# Patient Record
Sex: MALE | URBAN - METROPOLITAN AREA
[De-identification: names, ages, dates, MRNs, and addresses within clinical notes are randomized per-mention and may not be internally consistent; named-entity substitution may affect disease eponyms.]

---

## 2024-02-16 ENCOUNTER — OV NP (OUTPATIENT)
Dept: URBAN - METROPOLITAN AREA CLINIC 107 | Facility: CLINIC | Age: 39
End: 2024-02-16
Payer: OTHER GOVERNMENT

## 2024-02-16 ENCOUNTER — LAB (OUTPATIENT)
Dept: URBAN - METROPOLITAN AREA CLINIC 107 | Facility: CLINIC | Age: 39
End: 2024-02-16

## 2024-02-16 VITALS
DIASTOLIC BLOOD PRESSURE: 85 MMHG | HEART RATE: 85 BPM | SYSTOLIC BLOOD PRESSURE: 149 MMHG | BODY MASS INDEX: 27.95 KG/M2 | TEMPERATURE: 97.7 F | WEIGHT: 217.8 LBS | HEIGHT: 74 IN

## 2024-02-16 DIAGNOSIS — K21.9 GASTROESOPHAGEAL REFLUX DISEASE, UNSPECIFIED WHETHER ESOPHAGITIS PRESENT: ICD-10-CM

## 2024-02-16 DIAGNOSIS — R13.19 ESOPHAGEAL DYSPHAGIA: ICD-10-CM

## 2024-02-16 DIAGNOSIS — R10.13 EPIGASTRIC PAIN: ICD-10-CM

## 2024-02-16 PROBLEM — 235595009: Status: ACTIVE | Noted: 2024-02-16

## 2024-02-16 PROCEDURE — 99204 OFFICE O/P NEW MOD 45 MIN: CPT

## 2024-02-16 NOTE — HPI-TODAY'S VISIT:
Mr. Rabago is a pleasant 38-year-old  presenting for evaluation of difficulty swallowing.  He was evaluated at [facility] on 1/3/2020 for for complaints of epigastric pain, burping, and pain radiating to his back.  He had right upper quadrant ultrasound is negative for acute process.  Her labs showed white blood cell count 6.65, hemoglobin 13.8, hematocrit 41.1, low MCH 27.8, platelet count 237,000, normal CMP, negative urinalysis.  He was given GI cocktail and had noticeable improvement of his symptoms.  He is recommended outpatient GI follow-up and started on omeprazole 20 mg daily.  Today he reports that this began in December neurovisit been overall unremarkable.  He reports that on omeprazole he began to feel very bloated so he discontinued this and his PCP started him on Bentyl.  He notes during swallowing difficulty and some chest discomfort.  He reports he feels bloated and has increasing gas, he will drink a carbonated beverage which will produce belching.  This improves his symptoms.  Had a previous endoscopy in 2020 while stationed in [facility] with notable food impaction.  He has attempted to make dietary modifications.  He does report use of ibuprofen, but this is sparingly.  He drinks alcohol on very rare occasions.  He does not smoke cigarettes.

## 2024-04-24 ENCOUNTER — LAB (OUTPATIENT)
Dept: URBAN - METROPOLITAN AREA CLINIC 113 | Facility: CLINIC | Age: 39
End: 2024-04-24

## 2024-04-24 ENCOUNTER — OV EP (OUTPATIENT)
Dept: URBAN - METROPOLITAN AREA CLINIC 113 | Facility: CLINIC | Age: 39
End: 2024-04-24
Payer: OTHER GOVERNMENT

## 2024-04-24 VITALS
HEART RATE: 65 BPM | SYSTOLIC BLOOD PRESSURE: 131 MMHG | DIASTOLIC BLOOD PRESSURE: 84 MMHG | HEIGHT: 74 IN | TEMPERATURE: 97.8 F | RESPIRATION RATE: 18 BRPM | BODY MASS INDEX: 27.46 KG/M2 | WEIGHT: 214 LBS

## 2024-04-24 DIAGNOSIS — K21.9 GASTROESOPHAGEAL REFLUX DISEASE, UNSPECIFIED WHETHER ESOPHAGITIS PRESENT: ICD-10-CM

## 2024-04-24 DIAGNOSIS — R13.19 ESOPHAGEAL DYSPHAGIA: ICD-10-CM

## 2024-04-24 DIAGNOSIS — R93.3 ABNORMAL BARIUM SWALLOW: ICD-10-CM

## 2024-04-24 DIAGNOSIS — R10.13 EPIGASTRIC PAIN: ICD-10-CM

## 2024-04-24 PROCEDURE — 99214 OFFICE O/P EST MOD 30 MIN: CPT

## 2024-04-24 RX ORDER — PANTOPRAZOLE SODIUM 40 MG/1
1 TABLET TABLET, DELAYED RELEASE ORAL ONCE A DAY
Qty: 30 | OUTPATIENT
Start: 2024-04-24

## 2024-04-24 NOTE — HPI-TODAY'S VISIT:
This is a pleasant 39-year-old gentleman presenting for follow-up regarding dysphagia.  He was last seen in office on 2/16/2024 for evaluation of epigastric pain and dysphagia.  An EGD was discussed but as patient was a  with the  he preferred to start with noninvasive testing due to grounding protocols.  An H. pylori breath test was also ordered.  His barium swallow completed on 3/8/2024 showed gastroesophageal reflux present.  13 mm barium not passed through the GEJ however the junction is seen to relax and radial allow passage of large amounts of contrast.  The reflux appears to be somewhat displaced the pill more proximally within the distal esophagus at times.  Given the relaxation and appearance not felt to be stricture.  Patient reports that he did not complete H. pylori breath test.  Today reports he is feeling overall better.  Intermittent episodes of heartburn and dysphagia.  We discussed his barium swallow.  His bowels are regular without blood per rectum or melena.  He denies abdominal bloating or pain.  His weight has been stable.

## 2024-05-02 ENCOUNTER — OFFICE VISIT (OUTPATIENT)
Dept: URBAN - METROPOLITAN AREA CLINIC 107 | Facility: CLINIC | Age: 39
End: 2024-05-02

## 2024-05-09 ENCOUNTER — TELEPHONE ENCOUNTER (OUTPATIENT)
Dept: URBAN - METROPOLITAN AREA CLINIC 113 | Facility: CLINIC | Age: 39
End: 2024-05-09